# Patient Record
Sex: FEMALE | Race: WHITE | NOT HISPANIC OR LATINO | URBAN - METROPOLITAN AREA
[De-identification: names, ages, dates, MRNs, and addresses within clinical notes are randomized per-mention and may not be internally consistent; named-entity substitution may affect disease eponyms.]

---

## 2017-01-05 ENCOUNTER — EMERGENCY (EMERGENCY)
Facility: HOSPITAL | Age: 27
LOS: 1 days | Discharge: HOME | End: 2017-01-05
Attending: EMERGENCY MEDICINE | Admitting: EMERGENCY MEDICINE
Payer: COMMERCIAL

## 2017-01-05 VITALS
SYSTOLIC BLOOD PRESSURE: 130 MMHG | TEMPERATURE: 98 F | HEART RATE: 68 BPM | WEIGHT: 121.03 LBS | RESPIRATION RATE: 16 BRPM | DIASTOLIC BLOOD PRESSURE: 64 MMHG | HEIGHT: 64 IN | OXYGEN SATURATION: 100 %

## 2017-01-05 DIAGNOSIS — S01.111A LACERATION WITHOUT FOREIGN BODY OF RIGHT EYELID AND PERIOCULAR AREA, INITIAL ENCOUNTER: ICD-10-CM

## 2017-01-05 DIAGNOSIS — Z79.899 OTHER LONG TERM (CURRENT) DRUG THERAPY: ICD-10-CM

## 2017-01-05 PROCEDURE — 12011 RPR F/E/E/N/L/M 2.5 CM/<: CPT

## 2017-01-05 PROCEDURE — 99053 MED SERV 10PM-8AM 24 HR FAC: CPT

## 2017-01-05 PROCEDURE — 99283 EMERGENCY DEPT VISIT LOW MDM: CPT | Mod: 25

## 2017-01-05 NOTE — ED PROVIDER NOTE - OBJECTIVE STATEMENT
25 yo female last tetanus stated 3 months ago to ED c/o right supra-orbital laceration sustained while accidentally "banging heads" with SO. No LOC, denies n/v.

## 2017-01-05 NOTE — ED PROVIDER NOTE - RIGHT FACE
+ 1.5 cm linear laceration right supra-orbital area, no orbital rim step-off, neg ecchymosis, Neg wilson's/raccoon's.

## 2017-01-05 NOTE — ED ADULT TRIAGE NOTE - CHIEF COMPLAINT QUOTE
laceration to right eyebrow, occurred after "banging heads" with her significant other  no HA, LOC or dizziness

## 2020-12-18 ENCOUNTER — EMERGENCY (EMERGENCY)
Facility: HOSPITAL | Age: 30
LOS: 1 days | Discharge: HOME | End: 2020-12-18
Attending: EMERGENCY MEDICINE | Admitting: EMERGENCY MEDICINE
Payer: COMMERCIAL

## 2020-12-18 VITALS
HEART RATE: 79 BPM | DIASTOLIC BLOOD PRESSURE: 73 MMHG | HEIGHT: 64 IN | SYSTOLIC BLOOD PRESSURE: 124 MMHG | TEMPERATURE: 99 F | RESPIRATION RATE: 20 BRPM | OXYGEN SATURATION: 97 %

## 2020-12-18 DIAGNOSIS — Z20.828 CONTACT WITH AND (SUSPECTED) EXPOSURE TO OTHER VIRAL COMMUNICABLE DISEASES: ICD-10-CM

## 2020-12-18 PROCEDURE — 99283 EMERGENCY DEPT VISIT LOW MDM: CPT

## 2020-12-18 NOTE — ED ADULT TRIAGE NOTE - CHIEF COMPLAINT QUOTE
Patient requesting COVID-19 testing prior to a surgical procedure she is having on Tuesday; patient is asymptomatic

## 2020-12-18 NOTE — ED PROVIDER NOTE - PATIENT PORTAL LINK FT
You can access the FollowMyHealth Patient Portal offered by [facility] by registering at the following website: [email or web address]. By joining [employer]s FollowMyHealth portal, you will also be able to view your health information using other applications (apps) compatible with our system.

## 2024-01-22 ENCOUNTER — APPOINTMENT (OUTPATIENT)
Dept: ANTEPARTUM | Facility: CLINIC | Age: 34
End: 2024-01-22
Payer: COMMERCIAL

## 2024-01-22 ENCOUNTER — ASOB RESULT (OUTPATIENT)
Age: 34
End: 2024-01-22

## 2024-01-22 PROCEDURE — 36415 COLL VENOUS BLD VENIPUNCTURE: CPT

## 2024-01-22 PROCEDURE — 76813 OB US NUCHAL MEAS 1 GEST: CPT

## 2024-01-22 PROCEDURE — 93976 VASCULAR STUDY: CPT

## 2024-03-18 ENCOUNTER — ASOB RESULT (OUTPATIENT)
Age: 34
End: 2024-03-18

## 2024-03-18 ENCOUNTER — APPOINTMENT (OUTPATIENT)
Dept: ANTEPARTUM | Facility: CLINIC | Age: 34
End: 2024-03-18
Payer: COMMERCIAL

## 2024-03-18 PROCEDURE — 76817 TRANSVAGINAL US OBSTETRIC: CPT

## 2024-03-18 PROCEDURE — 76811 OB US DETAILED SNGL FETUS: CPT

## 2024-05-25 ENCOUNTER — OUTPATIENT (OUTPATIENT)
Dept: OUTPATIENT SERVICES | Facility: HOSPITAL | Age: 34
LOS: 1 days | End: 2024-05-25
Payer: COMMERCIAL

## 2024-05-25 VITALS
HEART RATE: 85 BPM | SYSTOLIC BLOOD PRESSURE: 121 MMHG | TEMPERATURE: 98 F | DIASTOLIC BLOOD PRESSURE: 74 MMHG | RESPIRATION RATE: 16 BRPM

## 2024-05-25 DIAGNOSIS — Z98.1 ARTHRODESIS STATUS: Chronic | ICD-10-CM

## 2024-05-25 DIAGNOSIS — O26.899 OTHER SPECIFIED PREGNANCY RELATED CONDITIONS, UNSPECIFIED TRIMESTER: ICD-10-CM

## 2024-05-25 DIAGNOSIS — Z90.49 ACQUIRED ABSENCE OF OTHER SPECIFIED PARTS OF DIGESTIVE TRACT: Chronic | ICD-10-CM

## 2024-05-25 PROCEDURE — 99214 OFFICE O/P EST MOD 30 MIN: CPT

## 2024-05-25 PROCEDURE — 59025 FETAL NON-STRESS TEST: CPT

## 2024-05-25 PROCEDURE — 76818 FETAL BIOPHYS PROFILE W/NST: CPT

## 2024-05-25 NOTE — OB PROVIDER TRIAGE NOTE - NS_OBACUITYLEVEL_OBGYN_ALL_OB
Pain resolved, adherent to medication regimen, bland diet, lifting limitations, no drainage noted from incisions, and has follow-up appointments scheduled with surgeon and PCP.  No needs or concerns voiced.  Following for health care maintenance needs.  
3

## 2024-05-25 NOTE — OB PROVIDER TRIAGE NOTE - HISTORY OF PRESENT ILLNESS
34y yo  at 30+5 presenting for fall on her hands and knee at 11:15. She tripped over an uneven piece of sidewalk and lost her balance. When she fell, she caught herself on her hands and knees, then fell to her right hip. She does not believe that she hit her abdomen. Currently in no pain.  Denies ctx, LOF, VB. +FM    Ante: Spontaneous pregnancy. No elevated BPs. Passed GCT. Anemia noted on recent CBC and started on PO iron.    NIPS and anatomy scans wnl. EFW 1475 two weeks ago.    OBHx:  G1- current    GynHx: denies fibroids, ovarian cysts, abnormal paps, STIs    PMH: exercise-induced asthma (has not used albuterol since )  PSH: l/s appy (ruptured) , vocal cord polyp removal , spinal ACDF C3-4   Meds: PNV, PO iron  NKDA    PE  T(C): 36.6 (05-25-24 @ 12:31), Max: 36.6 (05-25-24 @ 12:31)  HR: 85 (05-25-24 @ 12:31) (85 - 85)  BP: 121/74 (05-25-24 @ 12:31) (121/74 - 121/74)  RR: 16 (05-25-24 @ 12:31) (16 - 16)  SpO2: --  Gen: well appearing  Abd: soft, nontender, gravid  TAUS: cephalic presentation fundal placenta, BPP 8/8, MILLIE 12.1, copy of ultrasound printed and placed in paper chart    FHT: baseline 140, moderate variability, + accels, no decels  TOCO: ctx 2 in 1.5 hours    A/P   34y yo  at 30+5 presenting for fall on her hands and knee at 11:15, denies direct abdominal trauma.  -fetal status reassuring with 1.5 hours FHT and BPP 10/10  -patient asymptomatic  -d/c with PTL precautions and follow up at next PNV 6/3    MD Stew PGY3  D/W Dr. Lucero

## 2024-05-28 DIAGNOSIS — D64.9 ANEMIA, UNSPECIFIED: ICD-10-CM

## 2024-05-28 DIAGNOSIS — O99.513 DISEASES OF THE RESPIRATORY SYSTEM COMPLICATING PREGNANCY, THIRD TRIMESTER: ICD-10-CM

## 2024-05-28 DIAGNOSIS — Z3A.30 30 WEEKS GESTATION OF PREGNANCY: ICD-10-CM

## 2024-05-28 DIAGNOSIS — O99.013 ANEMIA COMPLICATING PREGNANCY, THIRD TRIMESTER: ICD-10-CM

## 2024-05-28 DIAGNOSIS — J45.990 EXERCISE INDUCED BRONCHOSPASM: ICD-10-CM

## 2024-05-28 DIAGNOSIS — Z04.3 ENCOUNTER FOR EXAMINATION AND OBSERVATION FOLLOWING OTHER ACCIDENT: ICD-10-CM

## 2024-06-04 ENCOUNTER — APPOINTMENT (OUTPATIENT)
Dept: ANTEPARTUM | Facility: CLINIC | Age: 34
End: 2024-06-04
Payer: COMMERCIAL

## 2024-06-04 ENCOUNTER — ASOB RESULT (OUTPATIENT)
Age: 34
End: 2024-06-04

## 2024-06-04 PROCEDURE — 76819 FETAL BIOPHYS PROFIL W/O NST: CPT | Mod: 59

## 2024-06-04 PROCEDURE — 76816 OB US FOLLOW-UP PER FETUS: CPT

## 2024-06-04 PROCEDURE — 76820 UMBILICAL ARTERY ECHO: CPT | Mod: 59

## 2024-06-23 PROBLEM — Z00.00 ENCOUNTER FOR PREVENTIVE HEALTH EXAMINATION: Status: ACTIVE | Noted: 2024-06-23

## 2024-07-09 ENCOUNTER — ASOB RESULT (OUTPATIENT)
Age: 34
End: 2024-07-09

## 2024-07-09 ENCOUNTER — APPOINTMENT (OUTPATIENT)
Dept: ANTEPARTUM | Facility: CLINIC | Age: 34
End: 2024-07-09
Payer: COMMERCIAL

## 2024-07-09 PROCEDURE — 76819 FETAL BIOPHYS PROFIL W/O NST: CPT | Mod: 59

## 2024-07-09 PROCEDURE — 76820 UMBILICAL ARTERY ECHO: CPT | Mod: 59

## 2024-07-09 PROCEDURE — 76816 OB US FOLLOW-UP PER FETUS: CPT

## 2024-07-25 ENCOUNTER — INPATIENT (INPATIENT)
Facility: HOSPITAL | Age: 34
LOS: 2 days | Discharge: HOME | DRG: 833 | End: 2024-07-28
Attending: STUDENT IN AN ORGANIZED HEALTH CARE EDUCATION/TRAINING PROGRAM | Admitting: STUDENT IN AN ORGANIZED HEALTH CARE EDUCATION/TRAINING PROGRAM
Payer: COMMERCIAL

## 2024-07-25 VITALS
RESPIRATION RATE: 16 BRPM | TEMPERATURE: 98 F | HEART RATE: 84 BPM | SYSTOLIC BLOOD PRESSURE: 141 MMHG | DIASTOLIC BLOOD PRESSURE: 77 MMHG

## 2024-07-25 DIAGNOSIS — O26.899 OTHER SPECIFIED PREGNANCY RELATED CONDITIONS, UNSPECIFIED TRIMESTER: ICD-10-CM

## 2024-07-25 DIAGNOSIS — Z90.49 ACQUIRED ABSENCE OF OTHER SPECIFIED PARTS OF DIGESTIVE TRACT: Chronic | ICD-10-CM

## 2024-07-25 DIAGNOSIS — Z98.1 ARTHRODESIS STATUS: Chronic | ICD-10-CM

## 2024-07-25 LAB
ALBUMIN SERPL ELPH-MCNC: 3.6 G/DL — SIGNIFICANT CHANGE UP (ref 3.3–5)
ALP SERPL-CCNC: 176 U/L — HIGH (ref 40–120)
ALT FLD-CCNC: 10 U/L — SIGNIFICANT CHANGE UP (ref 10–45)
ANION GAP SERPL CALC-SCNC: 11 MMOL/L — SIGNIFICANT CHANGE UP (ref 5–17)
AST SERPL-CCNC: 22 U/L — SIGNIFICANT CHANGE UP (ref 10–40)
BASOPHILS # BLD AUTO: 0.03 K/UL — SIGNIFICANT CHANGE UP (ref 0–0.2)
BASOPHILS NFR BLD AUTO: 0.4 % — SIGNIFICANT CHANGE UP (ref 0–2)
BILIRUB SERPL-MCNC: 0.6 MG/DL — SIGNIFICANT CHANGE UP (ref 0.2–1.2)
BLD GP AB SCN SERPL QL: NEGATIVE — SIGNIFICANT CHANGE UP
BLD GP AB SCN SERPL QL: NEGATIVE — SIGNIFICANT CHANGE UP
BUN SERPL-MCNC: 12 MG/DL — SIGNIFICANT CHANGE UP (ref 7–23)
CALCIUM SERPL-MCNC: 9.3 MG/DL — SIGNIFICANT CHANGE UP (ref 8.4–10.5)
CHLORIDE SERPL-SCNC: 105 MMOL/L — SIGNIFICANT CHANGE UP (ref 96–108)
CO2 SERPL-SCNC: 19 MMOL/L — LOW (ref 22–31)
CREAT ?TM UR-MCNC: 16 MG/DL — SIGNIFICANT CHANGE UP
CREAT SERPL-MCNC: 0.52 MG/DL — SIGNIFICANT CHANGE UP (ref 0.5–1.3)
EGFR: 127 ML/MIN/1.73M2 — SIGNIFICANT CHANGE UP
EOSINOPHIL # BLD AUTO: 0 K/UL — SIGNIFICANT CHANGE UP (ref 0–0.5)
EOSINOPHIL NFR BLD AUTO: 0 % — SIGNIFICANT CHANGE UP (ref 0–6)
FIBRINOGEN PPP-MCNC: 499 MG/DL — HIGH (ref 200–445)
GLUCOSE SERPL-MCNC: 107 MG/DL — HIGH (ref 70–99)
HCT VFR BLD CALC: 36.9 % — SIGNIFICANT CHANGE UP (ref 34.5–45)
HGB BLD-MCNC: 13.1 G/DL — SIGNIFICANT CHANGE UP (ref 11.5–15.5)
IMM GRANULOCYTES NFR BLD AUTO: 0.2 % — SIGNIFICANT CHANGE UP (ref 0–0.9)
LDH SERPL L TO P-CCNC: SIGNIFICANT CHANGE UP (ref 50–242)
LYMPHOCYTES # BLD AUTO: 1.57 K/UL — SIGNIFICANT CHANGE UP (ref 1–3.3)
LYMPHOCYTES # BLD AUTO: 18.5 % — SIGNIFICANT CHANGE UP (ref 13–44)
MCHC RBC-ENTMCNC: 33.3 PG — SIGNIFICANT CHANGE UP (ref 27–34)
MCHC RBC-ENTMCNC: 35.5 GM/DL — SIGNIFICANT CHANGE UP (ref 32–36)
MCV RBC AUTO: 93.9 FL — SIGNIFICANT CHANGE UP (ref 80–100)
MONOCYTES # BLD AUTO: 0.7 K/UL — SIGNIFICANT CHANGE UP (ref 0–0.9)
MONOCYTES NFR BLD AUTO: 8.2 % — SIGNIFICANT CHANGE UP (ref 2–14)
NEUTROPHILS # BLD AUTO: 6.17 K/UL — SIGNIFICANT CHANGE UP (ref 1.8–7.4)
NEUTROPHILS NFR BLD AUTO: 72.7 % — SIGNIFICANT CHANGE UP (ref 43–77)
NRBC # BLD: 0 /100 WBCS — SIGNIFICANT CHANGE UP (ref 0–0)
PLATELET # BLD AUTO: 170 K/UL — SIGNIFICANT CHANGE UP (ref 150–400)
POTASSIUM SERPL-MCNC: 4.1 MMOL/L — SIGNIFICANT CHANGE UP (ref 3.5–5.3)
POTASSIUM SERPL-SCNC: 4.1 MMOL/L — SIGNIFICANT CHANGE UP (ref 3.5–5.3)
PROT ?TM UR-MCNC: <4 MG/DL — SIGNIFICANT CHANGE UP (ref 0–12)
PROT SERPL-MCNC: 6.7 G/DL — SIGNIFICANT CHANGE UP (ref 6–8.3)
PROT/CREAT UR-RTO: SIGNIFICANT CHANGE UP (ref 0–0.2)
RBC # BLD: 3.93 M/UL — SIGNIFICANT CHANGE UP (ref 3.8–5.2)
RBC # FLD: 13.2 % — SIGNIFICANT CHANGE UP (ref 10.3–14.5)
RH IG SCN BLD-IMP: POSITIVE — SIGNIFICANT CHANGE UP
RH IG SCN BLD-IMP: POSITIVE — SIGNIFICANT CHANGE UP
SODIUM SERPL-SCNC: 135 MMOL/L — SIGNIFICANT CHANGE UP (ref 135–145)
URATE SERPL-MCNC: 4.7 MG/DL — SIGNIFICANT CHANGE UP (ref 2.5–7)
WBC # BLD: 8.49 K/UL — SIGNIFICANT CHANGE UP (ref 3.8–10.5)
WBC # FLD AUTO: 8.49 K/UL — SIGNIFICANT CHANGE UP (ref 3.8–10.5)

## 2024-07-25 RX ORDER — ALBUTEROL 90 MCG
2 AEROSOL REFILL (GRAM) INHALATION EVERY 6 HOURS
Refills: 0 | Status: DISCONTINUED | OUTPATIENT
Start: 2024-07-25 | End: 2024-07-28

## 2024-07-25 RX ORDER — TRISODIUM CITRATE DIHYDRATE AND CITRIC ACID MONOHYDRATE 500; 334 MG/5ML; MG/5ML
15 SOLUTION ORAL EVERY 6 HOURS
Refills: 0 | Status: DISCONTINUED | OUTPATIENT
Start: 2024-07-25 | End: 2024-07-26

## 2024-07-25 RX ORDER — OXYTOCIN/RINGER'S LACTATE 20/1000 ML
PLASTIC BAG, INJECTION (ML) INTRAVENOUS
Qty: 30 | Refills: 0 | Status: DISCONTINUED | OUTPATIENT
Start: 2024-07-25 | End: 2024-07-26

## 2024-07-25 RX ORDER — CHLORHEXIDINE GLUCONATE 500 MG/1
1 CLOTH TOPICAL DAILY
Refills: 0 | Status: DISCONTINUED | OUTPATIENT
Start: 2024-07-25 | End: 2024-07-26

## 2024-07-25 RX ORDER — DEXTROSE MONOHYDRATE, SODIUM CHLORIDE, SODIUM LACTATE, CALCIUM CHLORIDE, MAGNESIUM CHLORIDE 1.5; 538; 448; 18.4; 5.08 G/100ML; MG/100ML; MG/100ML; MG/100ML; MG/100ML
1000 SOLUTION INTRAPERITONEAL
Refills: 0 | Status: DISCONTINUED | OUTPATIENT
Start: 2024-07-25 | End: 2024-07-26

## 2024-07-25 RX ORDER — OXYTOCIN/RINGER'S LACTATE 20/1000 ML
333.33 PLASTIC BAG, INJECTION (ML) INTRAVENOUS
Qty: 20 | Refills: 0 | Status: DISCONTINUED | OUTPATIENT
Start: 2024-07-25 | End: 2024-07-26

## 2024-07-25 RX ADMIN — Medication 2 MILLIUNIT(S)/MIN: at 21:05

## 2024-07-25 RX ADMIN — DEXTROSE MONOHYDRATE, SODIUM CHLORIDE, SODIUM LACTATE, CALCIUM CHLORIDE, MAGNESIUM CHLORIDE 125 MILLILITER(S): 1.5; 538; 448; 18.4; 5.08 SOLUTION INTRAPERITONEAL at 19:41

## 2024-07-25 NOTE — OB RN PATIENT PROFILE - FUNCTIONAL ASSESSMENT - DAILY ACTIVITY 5.
Oxygen training arranged for 8/1/18 at 2pm with [facility].  Parents aware of appointment   4 = No assist / stand by assistance

## 2024-07-25 NOTE — OB RN PATIENT PROFILE - NSSTATEDREASONFORADM_OBGYN_A_OB_FT
"I had a blood clot, called my doctor and he told me to come in,  had high blood pressure since I've been here and my doctor recommended I be induced"

## 2024-07-25 NOTE — OB RN PATIENT PROFILE - FALL HARM RISK - RISK INTERVENTIONS

## 2024-07-25 NOTE — OB PROVIDER H&P - NSLOWPPHRISK_OBGYN_A_OB
No previous uterine incision/Worthy Pregnancy/Less than or equal to 4 previous vaginal births/No known bleeding disorder/No history of postpartum hemorrhage/No other PPH risks indicated

## 2024-07-25 NOTE — OB PROVIDER H&P - HISTORY OF PRESENT ILLNESS
33yo  at 39w5d presenting with a blood clot that she noted earlier today. Per patient, she was wiping after urinating and noticed a 4x1cm clot. She denies vaginal bleeding before or after. -LOF. +FM.      Of note, patient found to be hypertensive in triage (141/77, 150/89, 147/83, 140/83). She denies toxic complaints such as headache, scotoma, CP, SOB, RUQ/epgiasric pain.     Patient last seen at OB office on  when she was 1.5cm dilated. Per patient, her MILLIE during that visit was low-normal. She returned the following day when her MILLIE was slightly higher. Per patient Mon MILLIE --> Tue 10-.       Ante: Spontaneous pregnancy. NIPT and anatomy scan wnl. Failed GCT, passed GTT. Normotensive throughout pregnancy.   EFW 3490g ()  GBS negative    OBHX: G1    GynHX: denies fibroids, ovarian cysts, abnormal pap smear. Reports a remote history of chlamydia treated in . Denies current STI or history of herpes.   MedHX: sports induced asthma, never intubated or hospitalized, uses inhaler once a year. Also reports being told to have a heart murmur several years ago by a PCP who did not recommend followup. Never seen cardiology.  Surghx:  lap appendectomy, not ruptured.  cervical spine surgery.  vocal cord surgery.  Medications: denies  Allergies: NKDA    Physical Exam:  T(C): 36.8 (24 @ 15:47), Max: 36.8 (24 @ 15:47)  HR: 84 (24 @ 15:47) (84 - 84)  BP: 141/77 (24 @ 15:47) (141/77 - 141/77)  RR: 16 (24 @ 15:47) (16 - 16)  SpO2: --    General: NAD  Pulm: no increased WOB  Abdomen: soft, gravid, nontender  Extremities: wnl   TAUS: Cephalic. Placenta [ ]. MILLIE [ ]. BPP 8/8.   VE:     EFM: [ ] bpm, mod variability, + accels, - decels; reactive and reassuring  [place]: no ctx        A/p:    - Fetal status is reassuring given BPP 8/8, appropriate MILLIE, reactive and reassuring NST  - Ultrasound attached in chart  - Labor unlikely given no CTX on monitor, no pain, cervical exam of [ ]   - Discharged with strict return precautions       33yo  at 39w5d presenting with a blood clot that she noted earlier today. Per patient, she was wiping after urinating and noticed a 4x1cm clot. She denies vaginal bleeding before or after. -LOF, -CTX, +FM.      Of note, patient found to be hypertensive in triage (141/77, 150/89, 147/83, 140/83). She denies toxic complaints such as headache, scotoma, CP, SOB, RUQ/epgiasric pain.     Patient last seen at OB office on  when she was 1.5cm dilated. Per patient, her MILLIE during that visit was low-normal. She returned the following day when her MILLIE was slightly higher. Per patient Mon MILLIE --> Tue 10-.       Ante: Spontaneous pregnancy. NIPT and anatomy scan wnl. Failed GCT, passed GTT. Normotensive throughout pregnancy.   EFW 3490g ()  GBS negative    OBHX: G1    GynHX: denies fibroids, ovarian cysts, abnormal pap smear. Reports a remote history of chlamydia treated in . Denies current STI or history of herpes.   MedHX: sports induced asthma, never intubated or hospitalized, uses inhaler once a year. Also reports being told she had a heart murmur several years ago by a PCP who did not recommend followup. Never seen cardiology.  Surghx:  lap appendectomy, not ruptured.  cervical spine surgery.  vocal cord surgery.  Medications: denies  Allergies: NKDA    Physical Exam:  T(C): 36.8 (24 @ 15:47), Max: 36.8 (24 @ 15:47)  HR: 84 (24 @ 15:47) (84 - 84)  BP: 141/77 (24 @ 15:47) (141/77 - 141/77)  RR: 16 (24 @ 15:47) (16 - 16)  SpO2: --    General: NAD  Pulm: no increased WOB  Abdomen: soft, gravid, nontender  Extremities: wnl   TAUS: Cephalic. Placenta lateral. MILLIE 9.28. BPP 8/8.   VE: 1.5cm  SSE: slight active bleeding from the cervix on a background of nonpurulent appearing mucus    EFM: 140 bpm, mod variability, + accels, - decels; reactive and reassuring  [place]: irregular ctx, 1q 10 min.

## 2024-07-25 NOTE — OB PROVIDER H&P - ASSESSMENT
A/p: 33yo  at 39w5d presenting with a 4x1cm blood clot earlier today and found to be hypertensive in triage.     -BPs in triage concerning for gHTN. Patient denies toxic symptoms of PEC. PEC labs wnl  -Minimal bleeding from the cervix noted on SSE, likely due to cervical ripening  -Fetal status is reassuring given BPP 8/8, appropriate MILLIE, reactive and reassuring NST  - Admit to L&D for induction given elevated BPs  - Consent signed for vaginal delivery and induction of labor    - Plan for cervical balloon and pitocin  - NPO  - IV fluids and labs ordered  - GBS -  - Continuous EFM     Discussed with Dr. Tessie Salter PGY3 and attending Dr. Nic Katz, PGY1

## 2024-07-25 NOTE — OB PROVIDER H&P - NSCORESITESY/N_GEN_A_CORE_RD
Notified [facility]  rep, Brad, # [phone] option # 2, plan is pending for dc [id number] pm.  Pending dc start of home care / infusion will be [id number] AM . No

## 2024-07-26 LAB — T PALLIDUM AB TITR SER: NEGATIVE — SIGNIFICANT CHANGE UP

## 2024-07-26 PROCEDURE — 88307 TISSUE EXAM BY PATHOLOGIST: CPT | Mod: 26

## 2024-07-26 RX ORDER — KETOROLAC TROMETHAMINE 10 MG
30 TABLET ORAL ONCE
Refills: 0 | Status: DISCONTINUED | OUTPATIENT
Start: 2024-07-26 | End: 2024-07-26

## 2024-07-26 RX ORDER — HYDROCORTISONE 1 %
1 CREAM (GRAM) TOPICAL EVERY 6 HOURS
Refills: 0 | Status: DISCONTINUED | OUTPATIENT
Start: 2024-07-26 | End: 2024-07-28

## 2024-07-26 RX ORDER — TRISODIUM CITRATE DIHYDRATE AND CITRIC ACID MONOHYDRATE 500; 334 MG/5ML; MG/5ML
30 SOLUTION ORAL ONCE
Refills: 0 | Status: DISCONTINUED | OUTPATIENT
Start: 2024-07-26 | End: 2024-07-26

## 2024-07-26 RX ORDER — OXYCODONE HYDROCHLORIDE 30 MG/1
5 TABLET ORAL ONCE
Refills: 0 | Status: DISCONTINUED | OUTPATIENT
Start: 2024-07-26 | End: 2024-07-28

## 2024-07-26 RX ORDER — MAGNESIUM HYDROXIDE 400 MG/5ML
30 SUSPENSION, ORAL (FINAL DOSE FORM) ORAL
Refills: 0 | Status: DISCONTINUED | OUTPATIENT
Start: 2024-07-26 | End: 2024-07-28

## 2024-07-26 RX ORDER — ACETAMINOPHEN 500 MG
975 TABLET ORAL
Refills: 0 | Status: DISCONTINUED | OUTPATIENT
Start: 2024-07-26 | End: 2024-07-28

## 2024-07-26 RX ORDER — SIMETHICONE 125 MG/1
80 TABLET, CHEWABLE ORAL EVERY 4 HOURS
Refills: 0 | Status: DISCONTINUED | OUTPATIENT
Start: 2024-07-26 | End: 2024-07-28

## 2024-07-26 RX ORDER — CHLORHEXIDINE GLUCONATE 500 MG/1
1 CLOTH TOPICAL DAILY
Refills: 0 | Status: DISCONTINUED | OUTPATIENT
Start: 2024-07-26 | End: 2024-07-26

## 2024-07-26 RX ORDER — CEFAZOLIN SODIUM 10 G
2000 VIAL (EA) INJECTION ONCE
Refills: 0 | Status: DISCONTINUED | OUTPATIENT
Start: 2024-07-26 | End: 2024-07-26

## 2024-07-26 RX ORDER — DIPHENHYDRAMINE HCL 25 MG
25 CAPSULE ORAL EVERY 6 HOURS
Refills: 0 | Status: DISCONTINUED | OUTPATIENT
Start: 2024-07-26 | End: 2024-07-28

## 2024-07-26 RX ORDER — OXYTOCIN/RINGER'S LACTATE 20/1000 ML
41.67 PLASTIC BAG, INJECTION (ML) INTRAVENOUS
Qty: 20 | Refills: 0 | Status: DISCONTINUED | OUTPATIENT
Start: 2024-07-26 | End: 2024-07-28

## 2024-07-26 RX ORDER — DIBUCAINE 1 %
1 OINTMENT (GRAM) TOPICAL EVERY 6 HOURS
Refills: 0 | Status: DISCONTINUED | OUTPATIENT
Start: 2024-07-26 | End: 2024-07-28

## 2024-07-26 RX ORDER — CRANBERRY FRUIT EXTRACT 650 MG
1 CAPSULE ORAL DAILY
Refills: 0 | Status: DISCONTINUED | OUTPATIENT
Start: 2024-07-26 | End: 2024-07-28

## 2024-07-26 RX ORDER — IBUPROFEN 200 MG
600 TABLET ORAL EVERY 6 HOURS
Refills: 0 | Status: DISCONTINUED | OUTPATIENT
Start: 2024-07-26 | End: 2024-07-28

## 2024-07-26 RX ORDER — LANOLIN 100 %
1 OINTMENT (GRAM) TOPICAL EVERY 6 HOURS
Refills: 0 | Status: DISCONTINUED | OUTPATIENT
Start: 2024-07-26 | End: 2024-07-28

## 2024-07-26 RX ORDER — BACTERIOSTATIC SODIUM CHLORIDE 0.9 %
3 VIAL (ML) INJECTION EVERY 8 HOURS
Refills: 0 | Status: DISCONTINUED | OUTPATIENT
Start: 2024-07-26 | End: 2024-07-28

## 2024-07-26 RX ORDER — WITCH HAZEL 500 MG/1
1 CLOTH TOPICAL EVERY 4 HOURS
Refills: 0 | Status: DISCONTINUED | OUTPATIENT
Start: 2024-07-26 | End: 2024-07-28

## 2024-07-26 RX ORDER — FAMOTIDINE 40 MG/1
20 TABLET, FILM COATED ORAL ONCE
Refills: 0 | Status: DISCONTINUED | OUTPATIENT
Start: 2024-07-26 | End: 2024-07-26

## 2024-07-26 RX ORDER — CLOSTRIDIUM TETANI TOXOID ANTIGEN (FORMALDEHYDE INACTIVATED), CORYNEBACTERIUM DIPHTHERIAE TOXOID ANTIGEN (FORMALDEHYDE INACTIVATED), BORDETELLA PERTUSSIS TOXOID ANTIGEN (GLUTARALDEHYDE INACTIVATED), BORDETELLA PERTUSSIS FILAMENTOUS HEMAGGLUTININ ANTIGEN (FORMALDEHYDE INACTIVATED), BORDETELLA PERTUSSIS PERTACTIN ANTIGEN, AND BORDETELLA PERTUSSIS FIMBRIAE 2/3 ANTIGEN 5; 2; 2.5; 5; 3; 5 [LF]/.5ML; [LF]/.5ML; UG/.5ML; UG/.5ML; UG/.5ML; UG/.5ML
0.5 INJECTION, SUSPENSION INTRAMUSCULAR ONCE
Refills: 0 | Status: DISCONTINUED | OUTPATIENT
Start: 2024-07-26 | End: 2024-07-28

## 2024-07-26 RX ORDER — OXYCODONE HYDROCHLORIDE 30 MG/1
5 TABLET ORAL
Refills: 0 | Status: DISCONTINUED | OUTPATIENT
Start: 2024-07-26 | End: 2024-07-28

## 2024-07-26 RX ORDER — IBUPROFEN 200 MG
600 TABLET ORAL EVERY 6 HOURS
Refills: 0 | Status: COMPLETED | OUTPATIENT
Start: 2024-07-26 | End: 2025-06-24

## 2024-07-26 RX ORDER — AZITHROMYCIN 250 MG
500 TABLET ORAL ONCE
Refills: 0 | Status: DISCONTINUED | OUTPATIENT
Start: 2024-07-26 | End: 2024-07-26

## 2024-07-26 RX ADMIN — Medication 975 MILLIGRAM(S): at 21:59

## 2024-07-26 RX ADMIN — Medication 600 MILLIGRAM(S): at 18:48

## 2024-07-26 RX ADMIN — Medication 3 MILLILITER(S): at 21:00

## 2024-07-26 RX ADMIN — Medication 600 MILLIGRAM(S): at 19:40

## 2024-07-26 RX ADMIN — Medication 30 MILLIGRAM(S): at 16:06

## 2024-07-26 RX ADMIN — Medication 3 MILLILITER(S): at 14:30

## 2024-07-26 RX ADMIN — Medication 30 MILLIGRAM(S): at 17:00

## 2024-07-26 NOTE — OB PROVIDER LABOR PROGRESS NOTE - NS_OBIHIFHRDETAILS_OBGYN_ALL_OB_FT
150 baseline, mod shaji, - accel, + prolonged 6 min decel; cat II
baseline 135, moderate variability, +accels, -decels; Cat I tracing
FHT Cat 2,  bpm, moderate variability, + accels, + nonrecurrent late decels. Moderate variability overall reassuring.
FHT Cat 2,  bpm, moderate variability, + accels, + nonrecurrent late decels. Moderate variability overall reassuring.
baseline 140, moderate variability, +accels, -decels; Cat I tracing
baseline 150, moderate variability, +accels, -decels; Cat I tracing
FHT Cat 1,  bpm, moderate variability, + accels, - decels.
FHT Cat 1,  bpm, moderate variability, + accels, - decels.

## 2024-07-26 NOTE — OB RN DELIVERY SUMMARY - NS_SEPSISRSKCALC_OBGYN_ALL_OB_FT
EOS calculated successfully. EOS Risk Factor: 0.5/1000 live births ([facility] national incidence); GA=39w5d; Temp=98.6; ROM=9.1; GBS='Negative'; Antibiotics='No antibiotics or any antibiotics < 2 hrs prior to birth'

## 2024-07-26 NOTE — OB PROVIDER LABOR PROGRESS NOTE - NS_SUBJECTIVE/OBJECTIVE_OBGYN_ALL_OB_FT
Patient seen at bedside feeling rectal pressure with contractions. SVe 6/70/-1.
Night team assuming care  EFM reviewed  Pt normotensive at this time
Patient seen at bedside feeling pressure, SVE fully/0.
EFM reviewed  Pit on at 6mu  Pt now meeting criteria for gHTN, denies visual changes, headache, CP, SOB, RUQ/epigastric pain
Pt seen at bedside for prolonged deceleration
Day team assuming care at this time. EFM and labor plan reviewed. Patient seen at bedside for Cat 2 tracing, SVE 5/50/-3. Patient repositioned with a peanut ball.
EFM reviewed  Normotensive  Pit at 8mu
Patient seen at bedside for IUPC placement. SVE 5-6/50/-3.

## 2024-07-26 NOTE — OB PROVIDER LABOR PROGRESS NOTE - ASSESSMENT
- Epidural and ISE in situ  - Titrate pitocin as tolerated, consider pausing if Cat 2 tracing persists  - gHTN: monitor BP's  - Will continue to monitor
- IUPC, ISE, and epidural in situ  - Titrate pitocin as tolerated  - Will continue to monitor
Continue to monitor  Tracing Cat I at this time
- Epidural, ISE, and IUPC in situ  - Titrate pitocin as tolerated  - Will continue to monitor
Pt was examined VE unchanged  Difficulty hearing FHR  FSE was placed at this time wihout difficulty  PT was aromed for scant fluid with FSE  FHR was audible and recovered as pt was repositioned from L Lateral to R Lateral  Attending made aware    Continue to monitor  Titrate pitocin as needed
- Epidural, ISE, and IUPC in situ  - Titrate pitocin as tolerated  - Anticipate   - Will continue to monitor

## 2024-07-26 NOTE — OB RN DELIVERY SUMMARY - NSSELHIDDEN_OBGYN_ALL_OB_FT
[NS_DeliveryAttending1_OBGYN_ALL_OB_FT:[id number]],[NS_DeliveryAssist1_OBGYN_ALL_OB_FT:[id number]]

## 2024-07-26 NOTE — OB PROVIDER DELIVERY SUMMARY - NSPROVIDERDELIVERYNOTE_OBGYN_ALL_OB_FT
Normal vaginal delivery of live female infant in ROT position, Apgars 8/9, weighing 3275g. Head delivered without complication. No nuchal cord. Shoulders delivered without complication. Infant to mom. Cord clamped x2 and cut. Segment cut for cord gases, cord blood collected. Second degree laceration repaired with 2-0 chromic suture. Placenta delivered spontaneously and intact. EBL 200ml. Hemostasis.

## 2024-07-26 NOTE — OB PROVIDER LABOR PROGRESS NOTE - NS_OBIHICONTRACTIONPATTERNDETAILS_OBGYN_ALL_OB_FT
Ctx q 4
Kala q2-4 minutes on 2 mU of pitocin.
ctx q2-4minutes
Kala q4 minutes on 2 mU of pitocin.
ctx q2-5minutes
ctx irregular, 2 in 10 minutes
Kala q3-4 minutes on 10 mU of pitocin.
Kala q2-4 minutes on 6 mU of pitocin.

## 2024-07-27 ENCOUNTER — TRANSCRIPTION ENCOUNTER (OUTPATIENT)
Age: 34
End: 2024-07-27

## 2024-07-27 RX ORDER — CRANBERRY FRUIT EXTRACT 650 MG
0 CAPSULE ORAL
Refills: 0 | DISCHARGE

## 2024-07-27 RX ORDER — FERROUS SULFATE 325(65) MG
0 TABLET ORAL
Refills: 0 | DISCHARGE

## 2024-07-27 RX ORDER — ACETAMINOPHEN 500 MG
3 TABLET ORAL
Qty: 0 | Refills: 0 | DISCHARGE
Start: 2024-07-27

## 2024-07-27 RX ORDER — IBUPROFEN 200 MG
1 TABLET ORAL
Qty: 0 | Refills: 0 | DISCHARGE
Start: 2024-07-27

## 2024-07-27 RX ADMIN — Medication 600 MILLIGRAM(S): at 12:40

## 2024-07-27 RX ADMIN — Medication 600 MILLIGRAM(S): at 00:47

## 2024-07-27 RX ADMIN — Medication 600 MILLIGRAM(S): at 17:48

## 2024-07-27 RX ADMIN — Medication 975 MILLIGRAM(S): at 09:21

## 2024-07-27 RX ADMIN — Medication 600 MILLIGRAM(S): at 18:40

## 2024-07-27 RX ADMIN — Medication 600 MILLIGRAM(S): at 06:20

## 2024-07-27 RX ADMIN — Medication 975 MILLIGRAM(S): at 10:20

## 2024-07-27 RX ADMIN — Medication 1 TABLET(S): at 11:47

## 2024-07-27 RX ADMIN — Medication 3 MILLILITER(S): at 14:00

## 2024-07-27 RX ADMIN — Medication 975 MILLIGRAM(S): at 21:03

## 2024-07-27 RX ADMIN — Medication 600 MILLIGRAM(S): at 11:47

## 2024-07-27 RX ADMIN — Medication 975 MILLIGRAM(S): at 02:58

## 2024-07-27 NOTE — DISCHARGE NOTE OB - HOSPITAL COURSE
Pt is a 34yF s/p vaginal delivery on 7/26 complicated by gestational hypertension. Please see delivery note for details.  During postpartum course patient's vitals were stable, vaginal bleeding appropriate, and pain well controlled.  On day of discharge patient was ambulating, pt had adequate oral intake, and was voiding freely.  Discharge instructions and precautions were given.  Will return to clinic in 1 week for BP check.

## 2024-07-27 NOTE — DISCHARGE NOTE OB - CARE PROVIDERS DIRECT ADDRESSES
[email or web address]

## 2024-07-27 NOTE — DISCHARGE NOTE OB - CARE PLAN
1 Principal Discharge DX:	Postpartum state  Assessment and plan of treatment:	Take Motrin 600mg every 6 hours and/or tylenol 650mg every 6 hours as needed for pain. Nothing per vagina until cleared by your OB - no intercourse, douching, tampons, etc.  Call your OB if you experience severe abdominal pain not improved by oral pain medications, heavy bright red vaginal bleeding saturating more than 1 pad per hour, or fever greater than 100.4F. Consider contraception options to be discussed with your OB.  Secondary Diagnosis:	Gestational HTN  Assessment and plan of treatment:	Follow up with your OB in one week for a blood pressure check.  Purchase electronic blood pressure cuff at your local pharmacy. Check blood pressure 3x a day and record pressures in a log. If bp >160/110, you develop a headache not relieved by tylenol, visual disturbances, chest pain, difficulty breathing, or right upper abdominal pain, call your doctor or the hospital, or go to your nearest emergency room.

## 2024-07-27 NOTE — DISCHARGE NOTE OB - CARE PROVIDER_API CALL
Ani Leon  Obstetrics and Gynecology  [address] [address]  Phone: [phone]  Fax: [phone]  Follow Up Time:

## 2024-07-27 NOTE — DISCHARGE NOTE OB - NS MD DC FALL RISK RISK
For information on Fall & Injury Prevention, visit: [email or web address] OR  [email or web address] OR  https://www.cdc.gov/steadi/patient.html

## 2024-07-27 NOTE — PROGRESS NOTE ADULT - ASSESSMENT
A/P 34y s/p , PPD#1, stable, meeting postpartum milestones   - gHTN: normotensive o/n with no toxic complaints  - Pain: well controlled on tylenol/motrin  - GI: Tolerating regular diet  - : urinating without difficulty/pain  - DVT prophylaxis: ambulating frequently  - Dispo: PPD 2, unless otherwise specified

## 2024-07-27 NOTE — PROGRESS NOTE ADULT - SUBJECTIVE AND OBJECTIVE BOX
Patient evaluated at bedside this morning, resting comfortable in bed, no acute events overnight.  She reports pain is well controlled with tylenol and motrin.  Denies headache, visual changes, chest pain, SOB, or RUQ/epigastric pain.  She denies dizziness, palpitations, nausea, vomiting, heavy vaginal bleeding or perineal discomfort. Reports decrease in amount of vaginal bleeding and denies clots.  She has been ambulating without assistance, voiding spontaneously.  Tolerating food well, without nausea/vomit.      Physical Exam:  T(C): 36.6 (07-27-24 @ 01:59), Max: 36.9 (07-26-24 @ 22:00)  HR: 73 (07-27-24 @ 01:59) (73 - 82)  BP: 107/64 (07-27-24 @ 01:59) (107/64 - 122/82)  RR: 18 (07-27-24 @ 01:59) (16 - 18)  SpO2: 98% (07-27-24 @ 01:59) (97% - 98%)    GA: NAD, comfortable, conversational  Abd: soft, nontender, nondistended, no rebound or guarding, uterus firm.  Extremities: no swelling or calf tenderness  Perineum: lochia wnl                          13.1   8.49  )-----------( 170      ( 25 Jul 2024 15:50 )             36.9     07-25    135  |  105  |  12  ----------------------------<  107<H>  4.1   |  19<L>  |  0.52    Ca    9.3      25 Jul 2024 15:50    TPro  6.7  /  Alb  3.6  /  TBili  0.6  /  DBili  x   /  AST  22  /  ALT  10  /  AlkPhos  176<H>  07-25    acetaminophen     Tablet .. 975 milliGRAM(s) Oral <User Schedule>  albuterol    90 MICROgram(s) HFA Inhaler 2 Puff(s) Inhalation every 6 hours PRN  benzocaine 20%/menthol 0.5% Spray 1 Spray(s) Topical every 6 hours PRN  dibucaine 1% Ointment 1 Application(s) Topical every 6 hours PRN  diphenhydrAMINE 25 milliGRAM(s) Oral every 6 hours PRN  diphtheria/tetanus/pertussis (acellular) Vaccine (Adacel) 0.5 milliLiter(s) IntraMuscular once  hydrocortisone 1% Cream 1 Application(s) Topical every 6 hours PRN  ibuprofen  Tablet. 600 milliGRAM(s) Oral every 6 hours  lanolin Ointment 1 Application(s) Topical every 6 hours PRN  magnesium hydroxide Suspension 30 milliLiter(s) Oral two times a day PRN  oxyCODONE    IR 5 milliGRAM(s) Oral once PRN  oxyCODONE    IR 5 milliGRAM(s) Oral every 3 hours PRN  oxytocin Infusion 41.667 milliUNIT(s)/Min IV Continuous <Continuous>  pramoxine 1%/zinc 5% Cream 1 Application(s) Topical every 4 hours PRN  prenatal multivitamin 1 Tablet(s) Oral daily  simethicone 80 milliGRAM(s) Chew every 4 hours PRN  sodium chloride 0.9% lock flush 3 milliLiter(s) IV Push every 8 hours  witch hazel Pads 1 Application(s) Topical every 4 hours PRN

## 2024-07-27 NOTE — DISCHARGE NOTE OB - PLAN OF CARE
Take Motrin 600mg every 6 hours and/or tylenol 650mg every 6 hours as needed for pain. Nothing per vagina until cleared by your OB - no intercourse, douching, tampons, etc.  Call your OB if you experience severe abdominal pain not improved by oral pain medications, heavy bright red vaginal bleeding saturating more than 1 pad per hour, or fever greater than 100.4F. Consider contraception options to be discussed with your OB. Follow up with your OB in one week for a blood pressure check.  Purchase electronic blood pressure cuff at your local pharmacy. Check blood pressure 3x a day and record pressures in a log. If bp >160/110, you develop a headache not relieved by tylenol, visual disturbances, chest pain, difficulty breathing, or right upper abdominal pain, call your doctor or the hospital, or go to your nearest emergency room.

## 2024-07-27 NOTE — DISCHARGE NOTE OB - PATIENT PORTAL LINK FT
You can access the FollowMyHealth Patient Portal offered by [facility] by registering at the following website: [email or web address]. By joining [employer]’s FollowMyHealth portal, you will also be able to view your health information using other applications (apps) compatible with our system.

## 2024-07-28 VITALS
HEART RATE: 76 BPM | DIASTOLIC BLOOD PRESSURE: 76 MMHG | OXYGEN SATURATION: 96 % | RESPIRATION RATE: 18 BRPM | SYSTOLIC BLOOD PRESSURE: 117 MMHG | TEMPERATURE: 99 F

## 2024-07-28 PROCEDURE — 85384 FIBRINOGEN ACTIVITY: CPT

## 2024-07-28 PROCEDURE — 83615 LACTATE (LD) (LDH) ENZYME: CPT

## 2024-07-28 PROCEDURE — 82570 ASSAY OF URINE CREATININE: CPT

## 2024-07-28 PROCEDURE — 86850 RBC ANTIBODY SCREEN: CPT

## 2024-07-28 PROCEDURE — 36415 COLL VENOUS BLD VENIPUNCTURE: CPT

## 2024-07-28 PROCEDURE — 59050 FETAL MONITOR W/REPORT: CPT

## 2024-07-28 PROCEDURE — 86901 BLOOD TYPING SEROLOGIC RH(D): CPT

## 2024-07-28 PROCEDURE — 86900 BLOOD TYPING SEROLOGIC ABO: CPT

## 2024-07-28 PROCEDURE — 84550 ASSAY OF BLOOD/URIC ACID: CPT

## 2024-07-28 PROCEDURE — 84156 ASSAY OF PROTEIN URINE: CPT

## 2024-07-28 PROCEDURE — 85025 COMPLETE CBC W/AUTO DIFF WBC: CPT

## 2024-07-28 PROCEDURE — 80053 COMPREHEN METABOLIC PANEL: CPT

## 2024-07-28 PROCEDURE — 86780 TREPONEMA PALLIDUM: CPT

## 2024-07-28 RX ADMIN — Medication 975 MILLIGRAM(S): at 02:46

## 2024-07-28 RX ADMIN — Medication 600 MILLIGRAM(S): at 06:26

## 2024-07-28 RX ADMIN — Medication 975 MILLIGRAM(S): at 08:54

## 2024-07-28 RX ADMIN — Medication 975 MILLIGRAM(S): at 09:50

## 2024-07-28 RX ADMIN — Medication 600 MILLIGRAM(S): at 00:08

## 2024-07-28 NOTE — PROGRESS NOTE ADULT - ASSESSMENT
A/P 34y s/p , PPD#2 , stable, meeting postpartum milestones   #gHTN  - one mild range pressure overnight,   - Pain: well controlled on tylenol/motrin  - GI: Tolerating regular diet  - : urinating without difficulty/pain  - DVT prophylaxis: ambulating frequently  - Dispo: PPD 2, unless otherwise specified     A/P 34y s/p , PPD#2 , stable, meeting postpartum milestones   #gHTN  - one mild range pressure overnight, without severe range pressures  - continue to monitor, will send home with BP cuff for home monitoring   #PPC  - Pain: well controlled on tylenol/motrin  - GI: Tolerating regular diet  - : urinating without difficulty/pain  - DVT prophylaxis: ambulating frequently  - Dispo: PPD 2, pending attending approval

## 2024-07-28 NOTE — PROGRESS NOTE ADULT - SUBJECTIVE AND OBJECTIVE BOX
Patient evaluated at bedside this morning, resting comfortable in bed, no acute events overnight. With one mild range 141/65 at 0200, vitals stable since.  She reports pain is well controlled with tylenol and motrin.  She denies chest pain, shortness of breath, nausea, vomiting, heavy vaginal bleeding.  She has been ambulating without assistance, voiding spontaneously.  Tolerating food well, without nausea/vomit.      Physical Exam:  T(C): 37.1 (07-28-24 @ 06:00), Max: 37.1 (07-28-24 @ 06:00)  HR: 76 (07-28-24 @ 06:00) (76 - 84)  BP: 117/76 (07-28-24 @ 06:00) (117/76 - 141/65)  RR: 18 (07-28-24 @ 06:00) (18 - 18)  SpO2: 96% (07-28-24 @ 06:00) (96% - 98%)    GA: NAD, A&O x 3  Pulm: no increased work of breathing  Abd: soft, nontender, nondistended, no rebound or guarding, uterus firm.  Extremities: no swelling or calf tenderness            acetaminophen     Tablet .. 975 milliGRAM(s) Oral <User Schedule>  albuterol    90 MICROgram(s) HFA Inhaler 2 Puff(s) Inhalation every 6 hours PRN  benzocaine 20%/menthol 0.5% Spray 1 Spray(s) Topical every 6 hours PRN  dibucaine 1% Ointment 1 Application(s) Topical every 6 hours PRN  diphenhydrAMINE 25 milliGRAM(s) Oral every 6 hours PRN  diphtheria/tetanus/pertussis (acellular) Vaccine (Adacel) 0.5 milliLiter(s) IntraMuscular once  hydrocortisone 1% Cream 1 Application(s) Topical every 6 hours PRN  ibuprofen  Tablet. 600 milliGRAM(s) Oral every 6 hours  lanolin Ointment 1 Application(s) Topical every 6 hours PRN  magnesium hydroxide Suspension 30 milliLiter(s) Oral two times a day PRN  oxyCODONE    IR 5 milliGRAM(s) Oral once PRN  oxyCODONE    IR 5 milliGRAM(s) Oral every 3 hours PRN  oxytocin Infusion 41.667 milliUNIT(s)/Min IV Continuous <Continuous>  pramoxine 1%/zinc 5% Cream 1 Application(s) Topical every 4 hours PRN  prenatal multivitamin 1 Tablet(s) Oral daily  simethicone 80 milliGRAM(s) Chew every 4 hours PRN  sodium chloride 0.9% lock flush 3 milliLiter(s) IV Push every 8 hours  witch hazel Pads 1 Application(s) Topical every 4 hours PRN

## 2024-07-29 ENCOUNTER — APPOINTMENT (OUTPATIENT)
Dept: CARE COORDINATION | Facility: HOME HEALTH | Age: 34
End: 2024-07-29
Payer: COMMERCIAL

## 2024-07-29 ENCOUNTER — NON-APPOINTMENT (OUTPATIENT)
Age: 34
End: 2024-07-29

## 2024-07-29 PROBLEM — J45.990 EXERCISE INDUCED BRONCHOSPASM: Chronic | Status: ACTIVE | Noted: 2024-07-25

## 2024-07-29 PROCEDURE — 99349 HOME/RES VST EST MOD MDM 40: CPT | Mod: 95

## 2024-07-29 RX ORDER — ACETAMINOPHEN 325 MG/1
325 TABLET ORAL
Refills: 0 | Status: ACTIVE | COMMUNITY
Start: 2024-07-29

## 2024-07-29 RX ORDER — IBUPROFEN 600 MG/1
600 TABLET, FILM COATED ORAL EVERY 6 HOURS
Qty: 56 | Refills: 0 | Status: ACTIVE | COMMUNITY
Start: 2024-07-29

## 2024-07-31 ENCOUNTER — NON-APPOINTMENT (OUTPATIENT)
Age: 34
End: 2024-07-31

## 2024-08-03 DIAGNOSIS — Z3A.39 39 WEEKS GESTATION OF PREGNANCY: ICD-10-CM

## 2024-08-03 DIAGNOSIS — O44.03 COMPLETE PLACENTA PREVIA NOS OR WITHOUT HEMORRHAGE, THIRD TRIMESTER: ICD-10-CM

## 2024-08-06 LAB — SURGICAL PATHOLOGY STUDY: SIGNIFICANT CHANGE UP

## 2024-08-12 ENCOUNTER — NON-APPOINTMENT (OUTPATIENT)
Age: 34
End: 2024-08-12

## 2024-08-26 ENCOUNTER — NON-APPOINTMENT (OUTPATIENT)
Age: 34
End: 2024-08-26

## 2024-12-09 ENCOUNTER — APPOINTMENT (OUTPATIENT)
Dept: ORTHOPEDIC SURGERY | Facility: CLINIC | Age: 34
End: 2024-12-09

## 2024-12-09 VITALS
HEIGHT: 64 IN | HEART RATE: 85 BPM | OXYGEN SATURATION: 100 % | BODY MASS INDEX: 24.59 KG/M2 | TEMPERATURE: 98 F | WEIGHT: 144 LBS

## 2024-12-09 DIAGNOSIS — Z87.39 PERSONAL HISTORY OF OTHER DISEASES OF THE MUSCULOSKELETAL SYSTEM AND CONNECTIVE TISSUE: ICD-10-CM

## 2024-12-09 DIAGNOSIS — Z78.9 OTHER SPECIFIED HEALTH STATUS: ICD-10-CM

## 2024-12-09 PROCEDURE — 72040 X-RAY EXAM NECK SPINE 2-3 VW: CPT

## 2024-12-09 PROCEDURE — 99203 OFFICE O/P NEW LOW 30 MIN: CPT

## 2024-12-09 PROCEDURE — 99204 OFFICE O/P NEW MOD 45 MIN: CPT

## 2025-06-02 ENCOUNTER — APPOINTMENT (OUTPATIENT)
Dept: ORTHOPEDIC SURGERY | Facility: CLINIC | Age: 35
End: 2025-06-02

## 2025-06-02 VITALS
BODY MASS INDEX: 24.59 KG/M2 | DIASTOLIC BLOOD PRESSURE: 96 MMHG | TEMPERATURE: 97.8 F | HEART RATE: 74 BPM | WEIGHT: 144 LBS | SYSTOLIC BLOOD PRESSURE: 151 MMHG | HEIGHT: 64 IN | OXYGEN SATURATION: 99 %

## 2025-06-02 RX ORDER — MELOXICAM 7.5 MG/1
7.5 TABLET ORAL TWICE DAILY
Qty: 60 | Refills: 1 | Status: ACTIVE | COMMUNITY
Start: 2025-06-02 | End: 1900-01-01

## 2025-06-02 NOTE — HISTORY OF PRESENT ILLNESS
[de-identified] : Patient Gabi Li 35-year-old Female presenting for cervical evaluation. Last seen in 12/09/2025. Patient states that she is experiencing intermittent cervical fare up. Most recent fare up was 2 weeks ago. Pain radiates from the neck to the left shoulder blade. Pain scale 6/10. History of ACDF - July 2022.

## 2025-06-05 DIAGNOSIS — M48.02 SPINAL STENOSIS, CERVICAL REGION: ICD-10-CM

## 2025-06-05 DIAGNOSIS — M50.30 OTHER CERVICAL DISC DEGENERATION, UNSPECIFIED CERVICAL REGION: ICD-10-CM

## 2025-06-05 DIAGNOSIS — Z98.1 ARTHRODESIS STATUS: ICD-10-CM
